# Patient Record
Sex: FEMALE | Race: WHITE | NOT HISPANIC OR LATINO | URBAN - METROPOLITAN AREA
[De-identification: names, ages, dates, MRNs, and addresses within clinical notes are randomized per-mention and may not be internally consistent; named-entity substitution may affect disease eponyms.]

---

## 2019-02-01 PROBLEM — 398050005 DIVERTICULAR DISEASE OF COLON: Status: ACTIVE | Noted: 2019-02-01

## 2019-02-06 PROBLEM — 267432004 PURE HYPERCHOLESTEROLEMIA: Status: ACTIVE | Noted: 2019-02-06

## 2019-02-06 PROBLEM — 235595009 GASTROESOPHAGEAL REFLUX DISEASE: Status: ACTIVE | Noted: 2019-02-06

## 2019-11-07 PROBLEM — 397825006 GASTRIC ULCER: Status: ACTIVE | Noted: 2019-11-07

## 2019-11-07 PROBLEM — 3723001 ARTHRITIS: Status: ACTIVE | Noted: 2019-11-07

## 2020-01-02 PROBLEM — 70153002 HEMORRHOIDS: Status: ACTIVE | Noted: 2020-01-02

## 2020-04-14 PROBLEM — 73430006 SLEEP APNEA: Status: ACTIVE | Noted: 2020-04-14

## 2020-06-09 ENCOUNTER — OFFICE VISIT (OUTPATIENT)
Dept: URBAN - METROPOLITAN AREA CLINIC 46 | Facility: CLINIC | Age: 60
End: 2020-06-09

## 2020-07-21 ENCOUNTER — OFFICE VISIT (OUTPATIENT)
Dept: URBAN - METROPOLITAN AREA CLINIC 46 | Facility: CLINIC | Age: 60
End: 2020-07-21

## 2020-10-20 ENCOUNTER — OFFICE VISIT (OUTPATIENT)
Dept: URBAN - METROPOLITAN AREA CLINIC 46 | Facility: CLINIC | Age: 60
End: 2020-10-20

## 2020-10-20 LAB
A/G RATIO: 2.1
ALBUMIN: (no result)
ALKALINE PHOSPHATASE: (no result)
ALT (SGPT): (no result)
AST (SGOT): (no result)
BASO (ABSOLUTE): (no result)
BASOS: (no result)
BILIRUBIN, TOTAL: (no result)
BUN/CREATININE RATIO: 13
BUN: (no result)
C-REACTIVE PROTEIN, QUANT: (no result)
CALCIUM: (no result)
CARBON DIOXIDE, TOTAL: (no result)
CHLORIDE: (no result)
CREATININE: (no result)
EGFR IF AFRICN AM: (no result)
EGFR IF NONAFRICN AM: (no result)
EOS (ABSOLUTE): (no result)
EOS: (no result)
GLOBULIN, TOTAL: (no result)
GLUCOSE: (no result)
HEMATOCRIT: (no result)
HEMATOLOGY COMMENTS:: (no result)
HEMOGLOBIN: (no result)
IMMATURE CELLS: (no result)
IMMATURE GRANS (ABS): (no result)
IMMATURE GRANULOCYTES: (no result)
LYMPHS (ABSOLUTE): (no result)
LYMPHS: (no result)
MCH: (no result)
MCHC: (no result)
MCV: (no result)
MONOCYTES(ABSOLUTE): (no result)
MONOCYTES: (no result)
NEUTROPHILS (ABSOLUTE): (no result)
NEUTROPHILS: (no result)
NRBC: (no result)
PLATELETS: (no result)
POTASSIUM: (no result)
PROTEIN, TOTAL: (no result)
RBC: (no result)
RDW: (no result)
SODIUM: (no result)
WBC: (no result)

## 2020-10-21 ENCOUNTER — LAB OUTSIDE AN ENCOUNTER (OUTPATIENT)
Dept: URBAN - METROPOLITAN AREA CLINIC 13 | Facility: CLINIC | Age: 60
End: 2020-10-21

## 2021-01-26 ENCOUNTER — OFFICE VISIT (OUTPATIENT)
Dept: URBAN - METROPOLITAN AREA TELEHEALTH 2 | Facility: TELEHEALTH | Age: 61
End: 2021-01-26

## 2021-02-04 ENCOUNTER — OFFICE VISIT (OUTPATIENT)
Dept: URBAN - METROPOLITAN AREA CLINIC 44 | Facility: CLINIC | Age: 61
End: 2021-02-04

## 2021-03-04 ENCOUNTER — OFFICE VISIT (OUTPATIENT)
Dept: URBAN - METROPOLITAN AREA CLINIC 44 | Facility: CLINIC | Age: 61
End: 2021-03-04

## 2021-03-10 ENCOUNTER — OFFICE VISIT (OUTPATIENT)
Dept: URBAN - METROPOLITAN AREA CLINIC 13 | Facility: CLINIC | Age: 61
End: 2021-03-10

## 2021-05-21 ENCOUNTER — OFFICE VISIT (OUTPATIENT)
Dept: URBAN - METROPOLITAN AREA CLINIC 46 | Facility: CLINIC | Age: 61
End: 2021-05-21

## 2021-06-10 ENCOUNTER — OFFICE VISIT (OUTPATIENT)
Dept: URBAN - METROPOLITAN AREA CLINIC 46 | Facility: CLINIC | Age: 61
End: 2021-06-10

## 2021-08-28 ENCOUNTER — TELEPHONE ENCOUNTER (OUTPATIENT)
Dept: URBAN - METROPOLITAN AREA CLINIC 13 | Facility: CLINIC | Age: 61
End: 2021-08-28

## 2021-08-28 RX ORDER — IBUPROFEN 200 MG/1
TABLET, COATED ORAL
OUTPATIENT
End: 2019-04-18

## 2021-08-28 RX ORDER — PREDNISONE 20 MG/1
TABLET ORAL
OUTPATIENT
Start: 2019-02-11 | End: 2019-08-15

## 2021-08-28 RX ORDER — MESALAMINE 4 G/60ML
ENEMA RECTAL
OUTPATIENT
Start: 2019-06-10 | End: 2019-08-15

## 2021-08-28 RX ORDER — MESALAMINE 4 G/60ML
ENEMA RECTAL
OUTPATIENT
Start: 2019-08-15 | End: 2019-10-03

## 2021-08-28 RX ORDER — DICYCLOMINE HYDROCHLORIDE 20 MG/1
TABLET ORAL
OUTPATIENT
Start: 2019-04-18 | End: 2021-05-21

## 2021-08-28 RX ORDER — ONDANSETRON 8 MG/1
TABLET ORAL
OUTPATIENT
Start: 2019-10-03 | End: 2020-01-02

## 2021-08-28 RX ORDER — BUDESONIDE 9 MG/1
TABLET, EXTENDED RELEASE ORAL
OUTPATIENT
Start: 2019-02-08 | End: 2019-04-18

## 2021-08-28 RX ORDER — USTEKINUMAB 130 MG/26ML
SOLUTION INTRAVENOUS
OUTPATIENT
End: 2020-10-20

## 2021-08-28 RX ORDER — PREDNISONE 10 MG/1
TABLET ORAL
OUTPATIENT
Start: 2019-09-05 | End: 2019-10-03

## 2021-08-28 RX ORDER — PREDNISONE 10 MG/1
TABLET ORAL
OUTPATIENT
Start: 2019-06-10 | End: 2019-08-15

## 2021-08-28 RX ORDER — METRONIDAZOLE 250 MG/1
TABLET ORAL
OUTPATIENT
Start: 2020-04-14 | End: 2020-06-09

## 2021-08-28 RX ORDER — OMEPRAZOLE 40 MG/1
CAPSULE, DELAYED RELEASE ORAL
OUTPATIENT
Start: 2020-06-09 | End: 2021-05-21

## 2021-08-28 RX ORDER — METRONIDAZOLE 500 MG/1
TABLET ORAL
OUTPATIENT
Start: 2019-08-15 | End: 2019-10-03

## 2021-08-28 RX ORDER — FERROUS SULFATE 324(65)MG
TABLET, DELAYED RELEASE (ENTERIC COATED) ORAL
OUTPATIENT
End: 2020-01-02

## 2021-08-29 ENCOUNTER — TELEPHONE ENCOUNTER (OUTPATIENT)
Dept: URBAN - METROPOLITAN AREA CLINIC 13 | Facility: CLINIC | Age: 61
End: 2021-08-29

## 2021-08-29 RX ORDER — ONDANSETRON HYDROCHLORIDE 4 MG/1
TABLET, FILM COATED ORAL
Status: ACTIVE | COMMUNITY
Start: 2021-05-21

## 2021-08-29 RX ORDER — OMEPRAZOLE 40 MG/1
CAPSULE, DELAYED RELEASE ORAL
Status: ACTIVE | COMMUNITY
Start: 2021-05-21

## 2021-08-29 RX ORDER — GUAIFENESIN 600 MG/1
TABLET, EXTENDED RELEASE ORAL
Status: ACTIVE | COMMUNITY

## 2021-09-13 ENCOUNTER — TELEPHONE ENCOUNTER (OUTPATIENT)
Dept: URBAN - METROPOLITAN AREA CLINIC 46 | Facility: CLINIC | Age: 61
End: 2021-09-13

## 2021-09-13 RX ORDER — MESALAMINE 375 MG/1
4 CAPSULES IN THE MORNING CAPSULE, EXTENDED RELEASE ORAL ONCE A DAY
Qty: 360 CAPSULE | Refills: 3
Start: 2019-02-08 | End: 2022-09-08

## 2021-09-16 ENCOUNTER — TELEPHONE ENCOUNTER (OUTPATIENT)
Dept: URBAN - METROPOLITAN AREA CLINIC 46 | Facility: CLINIC | Age: 61
End: 2021-09-16

## 2021-09-23 ENCOUNTER — TELEPHONE ENCOUNTER (OUTPATIENT)
Dept: URBAN - METROPOLITAN AREA CLINIC 46 | Facility: CLINIC | Age: 61
End: 2021-09-23

## 2021-11-12 ENCOUNTER — TELEPHONE ENCOUNTER (OUTPATIENT)
Dept: URBAN - METROPOLITAN AREA CLINIC 46 | Facility: CLINIC | Age: 61
End: 2021-11-12

## 2021-11-18 ENCOUNTER — OFFICE VISIT (OUTPATIENT)
Dept: URBAN - METROPOLITAN AREA CLINIC 46 | Facility: CLINIC | Age: 61
End: 2021-11-18
Payer: COMMERCIAL

## 2021-11-18 VITALS
SYSTOLIC BLOOD PRESSURE: 132 MMHG | TEMPERATURE: 98 F | HEIGHT: 67 IN | HEART RATE: 66 BPM | WEIGHT: 247.2 LBS | DIASTOLIC BLOOD PRESSURE: 65 MMHG | BODY MASS INDEX: 38.8 KG/M2

## 2021-11-18 DIAGNOSIS — K51.311 ULCERATIVE RECTOSIGMOIDITIS WITH RECTAL BLEEDING: ICD-10-CM

## 2021-11-18 PROCEDURE — 99214 OFFICE O/P EST MOD 30 MIN: CPT | Performed by: INTERNAL MEDICINE

## 2021-11-18 RX ORDER — GUAIFENESIN 600 MG/1
TABLET, EXTENDED RELEASE ORAL
Status: ACTIVE | COMMUNITY

## 2021-11-18 RX ORDER — MESALAMINE 375 MG/1
4 CAPSULES IN THE MORNING CAPSULE, EXTENDED RELEASE ORAL ONCE A DAY
Qty: 360 CAPSULE | Refills: 3 | Status: ACTIVE | COMMUNITY
Start: 2019-02-08 | End: 2022-09-08

## 2021-11-18 RX ORDER — ONDANSETRON HYDROCHLORIDE 4 MG/1
TABLET, FILM COATED ORAL
Status: DISCONTINUED | COMMUNITY
Start: 2021-05-21

## 2021-11-18 RX ORDER — ACETAMINOPHEN 500 MG/1
CAPSULE, LIQUID FILLED ORAL
Status: ACTIVE | COMMUNITY

## 2021-11-18 RX ORDER — ROSUVASTATIN CALCIUM 20 MG/1
1 TABLET TABLET, FILM COATED ORAL ONCE A DAY
Status: ACTIVE | COMMUNITY

## 2021-11-18 RX ORDER — PREDNISONE 10 MG/1
20MG ONCE A DAY FOR 3 WEEKS THEN 10MG ONCE A DAY FOR 3 WEEKS TABLET ORAL ONCE A DAY
Qty: 90 | Refills: 1 | OUTPATIENT
Start: 2021-11-18 | End: 2022-01-17

## 2021-11-18 RX ORDER — UBIDECARENONE 50 MG
AS DIRECTED CAPSULE ORAL
Status: ACTIVE | COMMUNITY

## 2021-11-18 RX ORDER — HYDROCHLOROTHIAZIDE 25 MG/1
1 TABLET IN THE MORNING TABLET ORAL ONCE A DAY
Status: ACTIVE | COMMUNITY

## 2021-11-18 RX ORDER — AMOXICILLIN 500 MG
AS DIRECTED CAPSULE ORAL
Status: ACTIVE | COMMUNITY

## 2021-11-18 RX ORDER — OMEPRAZOLE 40 MG/1
CAPSULE, DELAYED RELEASE ORAL
Status: DISCONTINUED | COMMUNITY
Start: 2021-05-21

## 2021-11-18 RX ORDER — FLUTICASONE PROPIONATE 50 UG/1
1 SPRAY IN EACH NOSTRIL SPRAY, METERED NASAL ONCE A DAY
Status: ACTIVE | COMMUNITY

## 2021-11-18 NOTE — HPI-TODAY'S VISIT:
Pt dx with left sided Ulcerative Colitis in 2/2019; still with symptoms despite oral and topical therapy. Re-staging colonsocopy 11/2019 with left sided colitis confirmed on bx and pt started on Stelara and on maintenance 90mg every 8 weeks. Despite therapy has symptoms after 6 weeks from last dose and changed dose to every 6 weeks last visit @ 5/2021. Now presents for a follow up. States had  a 2 week delay in getting dose in 8/2021  due to insurane recert and since has had more issues and having symptoms at 4 weeks  with bloody mucus in stools. Despite recent dose still having bloody mucus in stools. No fevers or chills. No nausea or emesis. No wwight loss. Last labs 7/2021 with normal CBC and CMP

## 2022-02-22 ENCOUNTER — OFFICE VISIT (OUTPATIENT)
Dept: URBAN - METROPOLITAN AREA CLINIC 46 | Facility: CLINIC | Age: 62
End: 2022-02-22
Payer: COMMERCIAL

## 2022-02-22 VITALS
HEIGHT: 67 IN | TEMPERATURE: 98.2 F | SYSTOLIC BLOOD PRESSURE: 124 MMHG | HEART RATE: 73 BPM | WEIGHT: 249.2 LBS | BODY MASS INDEX: 39.11 KG/M2 | DIASTOLIC BLOOD PRESSURE: 63 MMHG

## 2022-02-22 DIAGNOSIS — R10.13 DYSPEPSIA: ICD-10-CM

## 2022-02-22 DIAGNOSIS — K51.311 ULCERATIVE RECTOSIGMOIDITIS WITH RECTAL BLEEDING: ICD-10-CM

## 2022-02-22 PROCEDURE — 99214 OFFICE O/P EST MOD 30 MIN: CPT | Performed by: INTERNAL MEDICINE

## 2022-02-22 RX ORDER — ACETAMINOPHEN 500 MG/1
CAPSULE, LIQUID FILLED ORAL
Status: ACTIVE | COMMUNITY

## 2022-02-22 RX ORDER — UBIDECARENONE 50 MG
AS DIRECTED CAPSULE ORAL
Status: ACTIVE | COMMUNITY

## 2022-02-22 RX ORDER — PANTOPRAZOLE SODIUM 40 MG/1
1 TABLET TABLET, DELAYED RELEASE ORAL ONCE A DAY
Qty: 30 | Refills: 3 | OUTPATIENT
Start: 2022-02-22

## 2022-02-22 RX ORDER — AMOXICILLIN 500 MG
AS DIRECTED CAPSULE ORAL
Status: ACTIVE | COMMUNITY

## 2022-02-22 RX ORDER — GUAIFENESIN 600 MG/1
TABLET, EXTENDED RELEASE ORAL
Status: DISCONTINUED | COMMUNITY

## 2022-02-22 RX ORDER — PREDNISONE 10 MG/1
2 TABLET TABLET ORAL ONCE A DAY
Qty: 60 TABLET | Refills: 1 | OUTPATIENT
Start: 2022-02-22 | End: 2022-04-23

## 2022-02-22 RX ORDER — ROSUVASTATIN CALCIUM 20 MG/1
1 TABLET TABLET, FILM COATED ORAL ONCE A DAY
Status: ACTIVE | COMMUNITY

## 2022-02-22 RX ORDER — MESALAMINE 375 MG/1
4 CAPSULES IN THE MORNING CAPSULE, EXTENDED RELEASE ORAL ONCE A DAY
OUTPATIENT
Start: 2019-02-08

## 2022-02-22 RX ORDER — FLUTICASONE PROPIONATE 50 UG/1
1 SPRAY IN EACH NOSTRIL SPRAY, METERED NASAL ONCE A DAY
Status: ACTIVE | COMMUNITY

## 2022-02-22 RX ORDER — HYDROCHLOROTHIAZIDE 25 MG/1
1 TABLET IN THE MORNING TABLET ORAL ONCE A DAY
Status: ACTIVE | COMMUNITY

## 2022-02-22 RX ORDER — MESALAMINE 375 MG/1
4 CAPSULES IN THE MORNING CAPSULE, EXTENDED RELEASE ORAL ONCE A DAY
Qty: 360 CAPSULE | Refills: 3 | Status: ACTIVE | COMMUNITY
Start: 2019-02-08 | End: 2022-09-08

## 2022-02-22 NOTE — PHYSICAL EXAM GASTROINTESTINAL
Abdomen , soft, nontender, nondistended , no guarding or rigidity , no masses palpable , normal bowel sounds , Liver and Spleen , no hepatomegaly present , no hepatosplenomegaly , liver nontender , spleen not palpable Skin normal color for race, warm, dry and intact. No evidence of rash.

## 2022-02-22 NOTE — HPI-TODAY'S VISIT:
Pt dx with left sided Ulcerative Colitis in 2/2019; still with symptoms despite oral and topical therapy. Re-staging colonsocopy 11/2019 with left sided colitis confirmed on bx and pt started on Stelara and on maintenance 90mg every 8 weeks. Despite therapy symptoms after 6 weeks from last dose and changed dose to every 6 weeks  @ 5/2021. Seen for a follow up 11/2021 and had  a 2 week delay in getting dose in 8/2021  due to insurane recert and since has had more issues and having symptoms at 4 weeks  with bloody mucus in stools. Plan was to pulse with prednisone 20mg for 3 weeks then 10mg for 3 weeks. Pt now presents for a follow up. States did well on predinsone but since tapering flaring again despite compliance with Stelara 90mg every 6 weeks: tenesmus and bloody mucus stools; up to 10 a day. Also some dyspepsia and nausea. Denies weight loss or fevers; has had some diffuse joint pain.

## 2022-03-29 ENCOUNTER — TELEPHONE ENCOUNTER (OUTPATIENT)
Dept: URBAN - METROPOLITAN AREA CLINIC 46 | Facility: CLINIC | Age: 62
End: 2022-03-29

## 2022-03-29 RX ORDER — ONDANSETRON 8 MG/1
1 TABLET ON THE TONGUE AND ALLOW TO DISSOLVE TABLET, ORALLY DISINTEGRATING ORAL
Qty: 9 | Refills: 0 | OUTPATIENT
Start: 2022-04-05

## 2022-03-29 RX ORDER — PREDNISONE 10 MG/1
2 TABLET TABLET ORAL ONCE A DAY
Qty: 60 TABLET | Refills: 1
Start: 2022-02-22 | End: 2022-05-28

## 2022-04-08 ENCOUNTER — WEB ENCOUNTER (OUTPATIENT)
Dept: URBAN - METROPOLITAN AREA CLINIC 46 | Facility: CLINIC | Age: 62
End: 2022-04-08

## 2022-04-14 ENCOUNTER — OFFICE VISIT (OUTPATIENT)
Dept: URBAN - METROPOLITAN AREA CLINIC 46 | Facility: CLINIC | Age: 62
End: 2022-04-14

## 2022-04-22 PROBLEM — 64766004 ULCERATIVE COLITIS: Status: ACTIVE | Noted: 2021-09-03

## 2022-05-02 ENCOUNTER — TELEPHONE ENCOUNTER (OUTPATIENT)
Dept: URBAN - METROPOLITAN AREA CLINIC 46 | Facility: CLINIC | Age: 62
End: 2022-05-02

## 2022-05-04 ENCOUNTER — CLAIMS CREATED FROM THE CLAIM WINDOW (OUTPATIENT)
Dept: URBAN - METROPOLITAN AREA CLINIC 4 | Facility: CLINIC | Age: 62
End: 2022-05-04
Payer: COMMERCIAL

## 2022-05-04 ENCOUNTER — OFFICE VISIT (OUTPATIENT)
Dept: URBAN - METROPOLITAN AREA SURGERY CENTER 28 | Facility: SURGERY CENTER | Age: 62
End: 2022-05-04
Payer: COMMERCIAL

## 2022-05-04 DIAGNOSIS — K51.40 INFLAMMATORY POLYPS OF COLON WITHOUT COMPLICATIONS: ICD-10-CM

## 2022-05-04 DIAGNOSIS — K51.80 OTHER ULCERATIVE COLITIS WITHOUT COMPLICATIONS: ICD-10-CM

## 2022-05-04 DIAGNOSIS — K63.89 BACTERIAL OVERGROWTH SYNDROME: ICD-10-CM

## 2022-05-04 DIAGNOSIS — K51.40 INFLAMMATORY POLYP OF COLON: ICD-10-CM

## 2022-05-04 DIAGNOSIS — R13.14 CRICOPHARYNGEAL DISORDER: ICD-10-CM

## 2022-05-04 DIAGNOSIS — K63.89 CYST OF DUODENUM: ICD-10-CM

## 2022-05-04 DIAGNOSIS — K51.80 CHRONIC PANCOLONIC ULCERATIVE COLITIS: ICD-10-CM

## 2022-05-04 PROCEDURE — 45385 COLONOSCOPY W/LESION REMOVAL: CPT | Performed by: INTERNAL MEDICINE

## 2022-05-04 PROCEDURE — 43248 EGD GUIDE WIRE INSERTION: CPT | Performed by: INTERNAL MEDICINE

## 2022-05-04 PROCEDURE — G8907 PT DOC NO EVENTS ON DISCHARG: HCPCS | Performed by: INTERNAL MEDICINE

## 2022-05-04 PROCEDURE — 88305 TISSUE EXAM BY PATHOLOGIST: CPT | Performed by: PATHOLOGY

## 2022-05-04 PROCEDURE — 45380 COLONOSCOPY AND BIOPSY: CPT | Performed by: INTERNAL MEDICINE

## 2022-05-04 RX ORDER — UBIDECARENONE 50 MG
AS DIRECTED CAPSULE ORAL
Status: ACTIVE | COMMUNITY

## 2022-05-04 RX ORDER — PANTOPRAZOLE SODIUM 40 MG/1
1 TABLET TABLET, DELAYED RELEASE ORAL ONCE A DAY
Qty: 30 | Refills: 3 | Status: ACTIVE | COMMUNITY
Start: 2022-02-22

## 2022-05-04 RX ORDER — HYDROCHLOROTHIAZIDE 25 MG/1
1 TABLET IN THE MORNING TABLET ORAL ONCE A DAY
Status: ACTIVE | COMMUNITY

## 2022-05-04 RX ORDER — AMOXICILLIN 500 MG
AS DIRECTED CAPSULE ORAL
Status: ACTIVE | COMMUNITY

## 2022-05-04 RX ORDER — ONDANSETRON 8 MG/1
1 TABLET ON THE TONGUE AND ALLOW TO DISSOLVE TABLET, ORALLY DISINTEGRATING ORAL
Qty: 9 | Refills: 0 | Status: ACTIVE | COMMUNITY
Start: 2022-04-05

## 2022-05-04 RX ORDER — ACETAMINOPHEN 500 MG/1
CAPSULE, LIQUID FILLED ORAL
Status: ACTIVE | COMMUNITY

## 2022-05-04 RX ORDER — FLUTICASONE PROPIONATE 50 UG/1
1 SPRAY IN EACH NOSTRIL SPRAY, METERED NASAL ONCE A DAY
Status: ACTIVE | COMMUNITY

## 2022-05-04 RX ORDER — PREDNISONE 10 MG/1
2 TABLET TABLET ORAL ONCE A DAY
Qty: 60 TABLET | Refills: 1 | Status: ACTIVE | COMMUNITY
Start: 2022-02-22 | End: 2022-05-28

## 2022-05-04 RX ORDER — ROSUVASTATIN CALCIUM 20 MG/1
1 TABLET TABLET, FILM COATED ORAL ONCE A DAY
Status: ACTIVE | COMMUNITY

## 2022-05-04 RX ORDER — MESALAMINE 375 MG/1
4 CAPSULES IN THE MORNING CAPSULE, EXTENDED RELEASE ORAL ONCE A DAY
Status: ACTIVE | COMMUNITY
Start: 2019-02-08

## 2022-05-04 NOTE — HPI-TODAY'S VISIT:
Colonoscopy today with NO colitis seen. Symptoms likely functional. Not planning on changing stelara at this time.

## 2022-05-17 ENCOUNTER — ERX REFILL RESPONSE (OUTPATIENT)
Dept: URBAN - METROPOLITAN AREA CLINIC 44 | Facility: CLINIC | Age: 62
End: 2022-05-17

## 2022-05-17 RX ORDER — USTEKINUMAB 90 MG/ML
INJECT 1 SYRINGE UNDER THE SKIN EVERY SIX WEEKS AS DIRECTED INJECTION, SOLUTION SUBCUTANEOUS
Qty: 1 MILLILITER | Refills: 6 | OUTPATIENT

## 2022-05-24 ENCOUNTER — ERX REFILL RESPONSE (OUTPATIENT)
Dept: URBAN - METROPOLITAN AREA CLINIC 46 | Facility: CLINIC | Age: 62
End: 2022-05-24

## 2022-05-24 RX ORDER — PANTOPRAZOLE SODIUM 40 MG/1
1 TABLET TABLET, DELAYED RELEASE ORAL ONCE A DAY
Qty: 30 | Refills: 3 | OUTPATIENT

## 2022-05-24 RX ORDER — PANTOPRAZOLE SODIUM 40 MG/1
TAKE 1 TABLET BY MOUTH EVERY DAY TABLET, DELAYED RELEASE ORAL
Qty: 30 TABLET | Refills: 6 | OUTPATIENT

## 2022-06-17 ENCOUNTER — OFFICE VISIT (OUTPATIENT)
Dept: URBAN - METROPOLITAN AREA CLINIC 46 | Facility: CLINIC | Age: 62
End: 2022-06-17
Payer: COMMERCIAL

## 2022-06-17 VITALS
WEIGHT: 261.4 LBS | SYSTOLIC BLOOD PRESSURE: 123 MMHG | DIASTOLIC BLOOD PRESSURE: 73 MMHG | HEART RATE: 68 BPM | BODY MASS INDEX: 41.03 KG/M2 | TEMPERATURE: 98 F | HEIGHT: 67 IN

## 2022-06-17 DIAGNOSIS — R13.19 ESOPHAGEAL DYSPHAGIA: ICD-10-CM

## 2022-06-17 DIAGNOSIS — R10.13 DYSPEPSIA: ICD-10-CM

## 2022-06-17 DIAGNOSIS — K51.311 ULCERATIVE RECTOSIGMOIDITIS WITH RECTAL BLEEDING: ICD-10-CM

## 2022-06-17 PROBLEM — 40890009: Status: ACTIVE | Noted: 2022-06-17

## 2022-06-17 PROCEDURE — 99213 OFFICE O/P EST LOW 20 MIN: CPT | Performed by: INTERNAL MEDICINE

## 2022-06-17 RX ORDER — USTEKINUMAB 90 MG/ML
INJECT 1 SYRINGE UNDER THE SKIN EVERY SIX WEEKS AS DIRECTED INJECTION, SOLUTION SUBCUTANEOUS
Qty: 1 MILLILITER | Refills: 6 | Status: ACTIVE | COMMUNITY

## 2022-06-17 RX ORDER — OMEPRAZOLE 20 MG/1
1 TABLET 30 MINUTES BEFORE MORNING MEAL TABLET, DELAYED RELEASE ORAL ONCE A DAY
Qty: 30 | Refills: 3 | OUTPATIENT
Start: 2022-06-17

## 2022-06-17 RX ORDER — MESALAMINE 375 MG/1
4 CAPSULES IN THE MORNING CAPSULE, EXTENDED RELEASE ORAL ONCE A DAY
OUTPATIENT
Start: 2019-02-08

## 2022-06-17 RX ORDER — USTEKINUMAB 90 MG/ML
INJECT 1 SYRINGE UNDER THE SKIN EVERY SIX WEEKS AS DIRECTED INJECTION, SOLUTION SUBCUTANEOUS
OUTPATIENT

## 2022-06-17 RX ORDER — PANTOPRAZOLE SODIUM 40 MG/1
TAKE 1 TABLET BY MOUTH EVERY DAY TABLET, DELAYED RELEASE ORAL
OUTPATIENT

## 2022-06-17 RX ORDER — HYDROCHLOROTHIAZIDE 25 MG/1
1 TABLET IN THE MORNING TABLET ORAL ONCE A DAY
Status: ACTIVE | COMMUNITY

## 2022-06-17 RX ORDER — MESALAMINE 375 MG/1
4 CAPSULES IN THE MORNING CAPSULE, EXTENDED RELEASE ORAL ONCE A DAY
Status: ACTIVE | COMMUNITY
Start: 2019-02-08

## 2022-06-17 RX ORDER — ACETAMINOPHEN 500 MG/1
CAPSULE, LIQUID FILLED ORAL
Status: ACTIVE | COMMUNITY

## 2022-06-17 RX ORDER — UBIDECARENONE 50 MG
AS DIRECTED CAPSULE ORAL
Status: ACTIVE | COMMUNITY

## 2022-06-17 RX ORDER — PANTOPRAZOLE SODIUM 40 MG/1
TAKE 1 TABLET BY MOUTH EVERY DAY TABLET, DELAYED RELEASE ORAL
Qty: 30 TABLET | Refills: 6 | Status: ACTIVE | COMMUNITY

## 2022-06-17 RX ORDER — FLUTICASONE PROPIONATE 50 UG/1
1 SPRAY IN EACH NOSTRIL SPRAY, METERED NASAL ONCE A DAY
Status: ACTIVE | COMMUNITY

## 2022-06-17 RX ORDER — AMOXICILLIN 500 MG
AS DIRECTED CAPSULE ORAL
Status: ACTIVE | COMMUNITY

## 2022-06-17 RX ORDER — LOPERAMIDE HCL 2 MG/1
1 TABLET TABLET, FILM COATED ORAL
Qty: 30 | Refills: 3 | OUTPATIENT
Start: 2022-06-17 | End: 2022-10-15

## 2022-06-17 RX ORDER — ROSUVASTATIN CALCIUM 20 MG/1
1 TABLET TABLET, FILM COATED ORAL ONCE A DAY
Status: ACTIVE | COMMUNITY

## 2022-06-17 NOTE — HPI-TODAY'S VISIT:
Pt dx with left sided Ulcerative Colitis in 2/2019; still with symptoms despite oral and topical therapy. Re-staging colonsocopy 11/2019 with left sided colitis confirmed on bx and pt started on Stelara and on maintenance 90mg every 8 weeks. Despite therapy symptoms after 6 weeks from last dose and changed dose to every 6 weeks  @ 5/2021. Seen for a follow up 11/2021 and had  a 2 week delay in getting dose in 8/2021  due to insurane recert and since has had more issues and having symptoms at 4 weeks  with bloody mucus in stools. Plan was to pulse with prednisone 20mg for 3 weeks then 10mg for 3 weeks.   Seen for a follow up 2/2022 and did well on predinsone but since tapering flaring again despite compliance with Stelara 90mg every 6 weeks: tenesmus and bloody mucus stools; up to 10 a day. Also some dyspepsia and nausea. Denies weight loss or fevers; has had some diffuse joint pain. Pt put back on prednisone and Stelara levels checked and good and no Ab's. Restaging colonoscopy with active symptoms and no active disease and symptoms felt to be functional.  Random bx negative for active disease. EGD also performed for dyspepsia and dysphagia witih normal esophagus s/p empiric dilation and  antral erosions (likely from prednisone)   Now presents for a follow up. No flare of diarrhea or colitis. 5 BM per day still and soft to loose but better then earlier in year when was 11. Still on Stelara eveyr 6 weeks and daily Apriso. Taking Pantoprazole and severe GERD and dysphagia better but still with globus. No new symptoms.

## 2022-06-28 ENCOUNTER — ERX REFILL RESPONSE (OUTPATIENT)
Dept: URBAN - METROPOLITAN AREA CLINIC 46 | Facility: CLINIC | Age: 62
End: 2022-06-28

## 2022-06-28 RX ORDER — MESALAMINE 375 MG/1
4 CAPSULES IN THE MORNING CAPSULE, EXTENDED RELEASE ORAL ONCE A DAY
OUTPATIENT

## 2022-06-28 RX ORDER — MESALAMINE 375 MG/1
TAKE 4 CAPSULES BY MOUTH EVERY DAY IN THE MORNING CAPSULE, EXTENDED RELEASE ORAL
Qty: 360 CAPSULE | Refills: 3 | OUTPATIENT

## 2022-07-21 ENCOUNTER — TELEPHONE ENCOUNTER (OUTPATIENT)
Dept: URBAN - METROPOLITAN AREA CLINIC 46 | Facility: CLINIC | Age: 62
End: 2022-07-21

## 2022-08-01 ENCOUNTER — TELEPHONE ENCOUNTER (OUTPATIENT)
Dept: URBAN - METROPOLITAN AREA CLINIC 46 | Facility: CLINIC | Age: 62
End: 2022-08-01

## 2022-08-05 ENCOUNTER — TELEPHONE ENCOUNTER (OUTPATIENT)
Dept: URBAN - METROPOLITAN AREA CLINIC 46 | Facility: CLINIC | Age: 62
End: 2022-08-05

## 2022-08-05 RX ORDER — MESALAMINE 375 MG/1
TAKE 4 CAPSULES BY MOUTH EVERY DAY IN THE MORNING CAPSULE, EXTENDED RELEASE ORAL
Qty: 360 CAPSULE | Refills: 3

## 2022-12-16 ENCOUNTER — OFFICE VISIT (OUTPATIENT)
Dept: URBAN - METROPOLITAN AREA CLINIC 46 | Facility: CLINIC | Age: 62
End: 2022-12-16

## 2022-12-27 ENCOUNTER — ERX REFILL RESPONSE (OUTPATIENT)
Dept: URBAN - METROPOLITAN AREA CLINIC 46 | Facility: CLINIC | Age: 62
End: 2022-12-27

## 2022-12-27 RX ORDER — USTEKINUMAB 90 MG/ML
MAINTENANCE: INJECT 1 SYRINGE SUBCUTANEOUSLY EVERY 6 WEEKS. REFRIGERATE. DO NOT FREEZE INJECTION, SOLUTION SUBCUTANEOUS
Qty: 1 | Refills: 9 | OUTPATIENT

## 2022-12-27 RX ORDER — USTEKINUMAB 90 MG/ML
INJECT 1 SYRINGE UNDER THE SKIN EVERY SIX WEEKS AS DIRECTED INJECTION, SOLUTION SUBCUTANEOUS
OUTPATIENT

## 2023-01-06 ENCOUNTER — OFFICE VISIT (OUTPATIENT)
Dept: URBAN - METROPOLITAN AREA CLINIC 46 | Facility: CLINIC | Age: 63
End: 2023-01-06
Payer: COMMERCIAL

## 2023-01-06 ENCOUNTER — LAB OUTSIDE AN ENCOUNTER (OUTPATIENT)
Dept: URBAN - METROPOLITAN AREA CLINIC 44 | Facility: CLINIC | Age: 63
End: 2023-01-06

## 2023-01-06 VITALS
SYSTOLIC BLOOD PRESSURE: 127 MMHG | WEIGHT: 250.4 LBS | HEIGHT: 67 IN | BODY MASS INDEX: 39.3 KG/M2 | TEMPERATURE: 98 F | HEART RATE: 69 BPM | DIASTOLIC BLOOD PRESSURE: 75 MMHG

## 2023-01-06 DIAGNOSIS — R14.0 BLOATING: ICD-10-CM

## 2023-01-06 DIAGNOSIS — K51.311 ULCERATIVE RECTOSIGMOIDITIS WITH RECTAL BLEEDING: ICD-10-CM

## 2023-01-06 PROBLEM — 116289008: Status: ACTIVE | Noted: 2023-01-06

## 2023-01-06 PROBLEM — 41364008: Status: ACTIVE | Noted: 2021-11-18

## 2023-01-06 PROCEDURE — 99214 OFFICE O/P EST MOD 30 MIN: CPT | Performed by: INTERNAL MEDICINE

## 2023-01-06 RX ORDER — AMOXICILLIN 500 MG
AS DIRECTED CAPSULE ORAL
Status: ACTIVE | COMMUNITY

## 2023-01-06 RX ORDER — BIOTIN 5 MG
AS DIRECTED TABLET ORAL
Status: ACTIVE | COMMUNITY

## 2023-01-06 RX ORDER — MESALAMINE 375 MG/1
TAKE 4 CAPSULES BY MOUTH EVERY DAY IN THE MORNING CAPSULE, EXTENDED RELEASE ORAL
Qty: 360 CAPSULE | Refills: 3 | Status: ACTIVE | COMMUNITY

## 2023-01-06 RX ORDER — FLUTICASONE PROPIONATE 50 UG/1
1 SPRAY IN EACH NOSTRIL SPRAY, METERED NASAL ONCE A DAY
Status: ACTIVE | COMMUNITY

## 2023-01-06 RX ORDER — HYDROCHLOROTHIAZIDE 25 MG/1
1 TABLET IN THE MORNING TABLET ORAL ONCE A DAY
Status: ACTIVE | COMMUNITY

## 2023-01-06 RX ORDER — ACETAMINOPHEN 500 MG/1
CAPSULE, LIQUID FILLED ORAL
Status: ACTIVE | COMMUNITY

## 2023-01-06 RX ORDER — USTEKINUMAB 90 MG/ML
MAINTENANCE: INJECT 1 SYRINGE SUBCUTANEOUSLY EVERY 6 WEEKS. REFRIGERATE. DO NOT FREEZE INJECTION, SOLUTION SUBCUTANEOUS
Qty: 1 | Refills: 9 | Status: ACTIVE | COMMUNITY

## 2023-01-06 RX ORDER — UBIDECARENONE 50 MG
AS DIRECTED CAPSULE ORAL
Status: ACTIVE | COMMUNITY

## 2023-01-06 RX ORDER — USTEKINUMAB 90 MG/ML
INJECT 1 SYRINGE UNDER THE SKIN EVERY SIX WEEKS AS DIRECTED INJECTION, SOLUTION SUBCUTANEOUS
OUTPATIENT

## 2023-01-06 RX ORDER — ROSUVASTATIN CALCIUM 20 MG/1
1 TABLET TABLET, FILM COATED ORAL ONCE A DAY
Status: ACTIVE | COMMUNITY

## 2023-01-06 NOTE — HPI-TODAY'S VISIT:
Pt dx with left sided Ulcerative Colitis in 2/2019; still with symptoms despite oral and topical therapy. Re-staging colonsocopy 11/2019 with left sided colitis confirmed on bx and pt started on Stelara and on maintenance 90mg every 8 weeks. Despite therapy symptoms after 6 weeks from last dose and changed dose to every 6 weeks  @ 5/2021. Seen for a follow up 11/2021 and had  a 2 week delay in getting dose in 8/2021  due to insurane recert and since has had more issues and having symptoms at 4 weeks  with bloody mucus in stools. Plan was to pulse with prednisone 20mg for 3 weeks then 10mg for 3 weeks.   Seen for a follow up 2/2022 and did well on predinsone but since tapering flaring again despite compliance with Stelara 90mg every 6 weeks: tenesmus and bloody mucus stools; up to 10 a day. Also some dyspepsia and nausea. Denies weight loss or fevers; has had some diffuse joint pain. Pt put back on prednisone and Stelara levels checked and good and no Ab's. Restaging colonoscopy with active symptoms and no active disease and symptoms felt to be functional.  Random bx negative for active disease. EGD also performed for dyspepsia and dysphagia witih normal esophagus s/p empiric dilation and  antral erosions (likely from prednisone)   6/2022: Now presents for a follow up. No flare of diarrhea or colitis. 5 BM per day still and soft to loose but better then earlier in year when was 11. Still on Stelara eveyr 6 weeks and daily Apriso. Taking Pantoprazole and severe GERD and dysphagia better but still with globus. No new symptoms.  1/6/2023: Pt for a follow up. Diarrhea has been better on it's own. Stools more formed. 2-3 BM per day. No bleeding. Peripheral Joint symptoms stable and no central joint pain. No fatigue. No dyspagia or GERD. Labs 9/2022 at PCP reviewed and normal CBC and CMP. On Stelara 90mg eveyr 6 weeks and Apriso 1.5gm per day. Still at times LLQ cramping. pt asking about coming off biologics over time.

## 2023-01-09 LAB — HEPATITIS B SURFACE ANTIGEN: (no result)

## 2023-01-11 LAB
HEPATITIS B SURFACE ANTIGEN: (no result)
MITOGEN-NIL: >10
QUANTIFERON NIL VALUE: 0.08
QUANTIFERON TB1 AG VALUE: <0
QUANTIFERON TB2 AG VALUE: <0
QUANTIFERON-TB GOLD PLUS: NEGATIVE

## 2023-03-08 ENCOUNTER — TELEPHONE ENCOUNTER (OUTPATIENT)
Dept: URBAN - METROPOLITAN AREA CLINIC 44 | Facility: CLINIC | Age: 63
End: 2023-03-08

## 2023-03-08 PROBLEM — 444548001: Status: ACTIVE | Noted: 2021-09-23

## 2023-03-08 RX ORDER — USTEKINUMAB 90 MG/ML
INJECT 1 SYRINGE UNDER THE SKIN EVERY SIX WEEKS AS DIRECTED INJECTION, SOLUTION SUBCUTANEOUS
Qty: 42 | Refills: 6

## 2023-04-04 ENCOUNTER — TELEPHONE ENCOUNTER (OUTPATIENT)
Dept: URBAN - METROPOLITAN AREA CLINIC 44 | Facility: CLINIC | Age: 63
End: 2023-04-04

## 2023-04-26 ENCOUNTER — TELEPHONE ENCOUNTER (OUTPATIENT)
Dept: URBAN - METROPOLITAN AREA CLINIC 46 | Facility: CLINIC | Age: 63
End: 2023-04-26

## 2023-06-28 ENCOUNTER — ERX REFILL RESPONSE (OUTPATIENT)
Dept: URBAN - METROPOLITAN AREA CLINIC 44 | Facility: CLINIC | Age: 63
End: 2023-06-28

## 2023-06-28 RX ORDER — OMEPRAZOLE 20 MG/1
TAKE 1 TABLET BY MOUTH EVERY DAY 30 MINS BEFORE BREAKFAST TABLET, DELAYED RELEASE ORAL
Qty: 30 TABLET | Refills: 4 | OUTPATIENT

## 2023-06-28 RX ORDER — OMEPRAZOLE 20 MG/1
TAKE 1 TABLET BY MOUTH EVERY DAY 30 MINS BEFORE BREAKFAST TABLET, DELAYED RELEASE ORAL
Qty: 30 TABLET | Refills: 11 | OUTPATIENT

## 2023-06-30 ENCOUNTER — ERX REFILL RESPONSE (OUTPATIENT)
Dept: URBAN - METROPOLITAN AREA CLINIC 44 | Facility: CLINIC | Age: 63
End: 2023-06-30

## 2023-06-30 RX ORDER — OMEPRAZOLE 20 MG/1
TAKE 1 TABLET BY MOUTH EVERY DAY 30 MINS BEFORE BREAKFAST TABLET, DELAYED RELEASE ORAL
Qty: 30 TABLET | Refills: 3 | OUTPATIENT

## 2023-06-30 RX ORDER — OMEPRAZOLE 20 MG/1
TAKE 1 TABLET BY MOUTH EVERY DAY 30 MINS BEFORE BREAKFAST TABLET, DELAYED RELEASE ORAL
Qty: 30 TABLET | Refills: 4 | OUTPATIENT

## 2023-08-16 ENCOUNTER — DASHBOARD ENCOUNTERS (OUTPATIENT)
Age: 63
End: 2023-08-16

## 2023-08-17 ENCOUNTER — WEB ENCOUNTER (OUTPATIENT)
Dept: URBAN - METROPOLITAN AREA CLINIC 46 | Facility: CLINIC | Age: 63
End: 2023-08-17

## 2023-08-17 RX ORDER — OMEPRAZOLE 20 MG/1
TAKE 1 TABLET BY MOUTH EVERY DAY 30 MINS BEFORE BREAKFAST TABLET, DELAYED RELEASE ORAL ONCE A DAY
Qty: 90 | Refills: 3

## 2023-08-18 ENCOUNTER — OFFICE VISIT (OUTPATIENT)
Dept: URBAN - METROPOLITAN AREA CLINIC 46 | Facility: CLINIC | Age: 63
End: 2023-08-18
Payer: COMMERCIAL

## 2023-08-18 VITALS
HEART RATE: 84 BPM | TEMPERATURE: 97.7 F | SYSTOLIC BLOOD PRESSURE: 124 MMHG | WEIGHT: 216.6 LBS | DIASTOLIC BLOOD PRESSURE: 61 MMHG | BODY MASS INDEX: 34 KG/M2 | HEIGHT: 67 IN

## 2023-08-18 DIAGNOSIS — R10.13 DYSPEPSIA: ICD-10-CM

## 2023-08-18 DIAGNOSIS — K51.311 ULCERATIVE RECTOSIGMOIDITIS WITH RECTAL BLEEDING: ICD-10-CM

## 2023-08-18 PROBLEM — 162031009: Status: ACTIVE | Noted: 2022-02-22

## 2023-08-18 PROCEDURE — 99214 OFFICE O/P EST MOD 30 MIN: CPT | Performed by: INTERNAL MEDICINE

## 2023-08-18 RX ORDER — LANSOPRAZOLE 30 MG/1
1 CAPSULE BEFORE A MEAL CAPSULE, DELAYED RELEASE ORAL ONCE A DAY
Status: ACTIVE | COMMUNITY

## 2023-08-18 RX ORDER — USTEKINUMAB 90 MG/ML
MAINTENANCE: INJECT 1 SYRINGE SUBCUTANEOUSLY EVERY 6 WEEKS. REFRIGERATE. DO NOT FREEZE INJECTION, SOLUTION SUBCUTANEOUS
Qty: 1 | Refills: 9 | Status: ACTIVE | COMMUNITY

## 2023-08-18 RX ORDER — APIXABAN 5 MG/1
1 TABLET TABLET, FILM COATED ORAL TWICE A DAY
Status: ACTIVE | COMMUNITY

## 2023-08-18 RX ORDER — USTEKINUMAB 90 MG/ML
INJECT 1 SYRINGE UNDER THE SKIN EVERY SIX WEEKS AS DIRECTED INJECTION, SOLUTION SUBCUTANEOUS
OUTPATIENT

## 2023-08-18 RX ORDER — ACETAMINOPHEN 500 MG/1
CAPSULE, LIQUID FILLED ORAL
Status: ACTIVE | COMMUNITY

## 2023-08-18 RX ORDER — FEXOFENADINE HYDROCHLORIDE 180 MG/1
1 TABLET SWALLOW WHOLE WITH WATER; DO NOT TAKE WITH FRUIT JUICES TABLET ORAL ONCE A DAY
Status: ACTIVE | COMMUNITY

## 2023-08-18 RX ORDER — AMOXICILLIN 500 MG
AS DIRECTED CAPSULE ORAL
Status: ACTIVE | COMMUNITY

## 2023-08-18 RX ORDER — ROSUVASTATIN CALCIUM 20 MG/1
1 TABLET TABLET, FILM COATED ORAL ONCE A DAY
Status: ACTIVE | COMMUNITY

## 2023-08-18 RX ORDER — HYDROCHLOROTHIAZIDE 25 MG/1
1 TABLET IN THE MORNING TABLET ORAL ONCE A DAY
Status: ACTIVE | COMMUNITY

## 2023-08-18 RX ORDER — FLECAINIDE ACETATE 50 MG/1
2 TABLETS TABLET ORAL
Status: ACTIVE | COMMUNITY

## 2023-08-18 RX ORDER — LANSOPRAZOLE 30 MG/1
1 CAPSULE BEFORE A MEAL CAPSULE, DELAYED RELEASE ORAL ONCE A DAY
Qty: 90 | Refills: 0

## 2023-08-18 RX ORDER — UBIDECARENONE 50 MG
AS DIRECTED CAPSULE ORAL
Status: ACTIVE | COMMUNITY

## 2023-08-18 RX ORDER — ONDANSETRON 8 MG/1
1 TABLET TABLET, ORALLY DISINTEGRATING ORAL
Qty: 30 | Refills: 3 | OUTPATIENT
Start: 2023-08-18

## 2023-08-18 RX ORDER — FLUTICASONE PROPIONATE 50 UG/1
1 SPRAY IN EACH NOSTRIL SPRAY, METERED NASAL ONCE A DAY
Status: ACTIVE | COMMUNITY

## 2023-08-18 RX ORDER — MESALAMINE 375 MG/1
TAKE 4 CAPSULES BY MOUTH EVERY DAY IN THE MORNING CAPSULE, EXTENDED RELEASE ORAL
OUTPATIENT

## 2023-08-18 RX ORDER — MESALAMINE 375 MG/1
TAKE 4 CAPSULES BY MOUTH EVERY DAY IN THE MORNING CAPSULE, EXTENDED RELEASE ORAL
Qty: 360 CAPSULE | Refills: 3 | Status: ACTIVE | COMMUNITY

## 2023-08-18 RX ORDER — BIOTIN 5 MG
AS DIRECTED TABLET ORAL
Status: ACTIVE | COMMUNITY

## 2023-08-18 NOTE — HPI-TODAY'S VISIT:
Pt dx with left sided Ulcerative Colitis in 2/2019; still with symptoms despite oral and topical therapy. Re-staging colonsocopy 11/2019 with left sided colitis confirmed on bx and pt started on Stelara and on maintenance 90mg every 8 weeks. Despite therapy symptoms after 6 weeks from last dose and changed dose to every 6 weeks  @ 5/2021. Seen for a follow up 11/2021 and had  a 2 week delay in getting dose in 8/2021  due to insurane recert and since has had more issues and having symptoms at 4 weeks  with bloody mucus in stools. Plan was to pulse with prednisone 20mg for 3 weeks then 10mg for 3 weeks.   Seen for a follow up 2/2022 and did well on predinsone but since tapering flaring again despite compliance with Stelara 90mg every 6 weeks: tenesmus and bloody mucus stools; up to 10 a day. Also some dyspepsia and nausea. Denies weight loss or fevers; has had some diffuse joint pain. Pt put back on prednisone and Stelara levels checked and good and no Ab's. Restaging colonoscopy with active symptoms and no active disease and symptoms felt to be functional.  Random bx negative for active disease. EGD also performed for dyspepsia and dysphagia witih normal esophagus s/p empiric dilation and  antral erosions (likely from prednisone)   6/2022: Now presents for a follow up. No flare of diarrhea or colitis. 5 BM per day still and soft to loose but better then earlier in year when was 11. Still on Stelara eveyr 6 weeks and daily Apriso. Taking Pantoprazole and severe GERD and dysphagia better but still with globus. No new symptoms.  1/6/2023: Pt for a follow up. Diarrhea has been better on it's own. Stools more formed. 2-3 BM per day. No bleeding. Peripheral Joint symptoms stable and no central joint pain. No fatigue. No dyspagia or GERD. Labs 9/2022 at PCP reviewed and normal CBC and CMP. On Stelara 90mg eveyr 6 weeks and Apriso 1.5gm per day. Still at times LLQ cramping. pt asking about coming off biologics over time.....QTG rechecked and neg; Stelara 90mg every 6 weeks continued; advised ok to wean off oral mesalamime  8/18/2023: For a follow up. No colitis symptoms but still with alternating BM. On Stelara 90mg every 6 weeks; did not stop oral mesalamine. recent dx of PAfibn and needig high dose Flecanaide; and now on ELiquis (4/2023). Since starting having GERD and dyspepsia but also ran out of PPI. No melena. No joint pain. Has some brain fog and fatigue. No labs since staring Eliquis

## 2023-08-23 LAB
A/G RATIO: 1.7
ABSOLUTE BASOPHILS: 80
ABSOLUTE EOSINOPHILS: 285
ABSOLUTE LYMPHOCYTES: 3865
ABSOLUTE MONOCYTES: 798
ABSOLUTE NEUTROPHILS: 6373
ALBUMIN: 4.3
ALKALINE PHOSPHATASE: 92
ALT (SGPT): 15
AST (SGOT): 17
BASOPHILS: 0.7
BILIRUBIN, TOTAL: 0.3
BUN/CREATININE RATIO: (no result)
BUN: 24
C-REACTIVE PROTEIN, QUANT: 4
CALCIUM: 9.9
CARBON DIOXIDE, TOTAL: 27
CHLORIDE: 99
CREATININE: 1.03
EGFR: 61
EOSINOPHILS: 2.5
GLOBULIN, TOTAL: 2.5
GLUCOSE: 93
HEMATOCRIT: 38.8
HEMOGLOBIN: 13
LYMPHOCYTES: 33.9
MCH: 28.9
MCHC: 33.5
MCV: 86.2
MITOGEN-NIL: >10
MONOCYTES: 7
MPV: 10.5
NEUTROPHILS: 55.9
PLATELET COUNT: 310
POTASSIUM: 3.9
PROTEIN, TOTAL: 6.8
QUANTIFERON NIL VALUE: 0.03
QUANTIFERON TB1 AG VALUE: 0
QUANTIFERON TB2 AG VALUE: 0
QUANTIFERON-TB GOLD PLUS: NEGATIVE
RDW: 13.5
RED BLOOD CELL COUNT: 4.5
SODIUM: 139
WHITE BLOOD CELL COUNT: 11.4

## 2023-08-24 ENCOUNTER — TELEPHONE ENCOUNTER (OUTPATIENT)
Dept: URBAN - METROPOLITAN AREA CLINIC 44 | Facility: CLINIC | Age: 63
End: 2023-08-24

## 2023-08-29 ENCOUNTER — TELEPHONE ENCOUNTER (OUTPATIENT)
Dept: URBAN - METROPOLITAN AREA CLINIC 46 | Facility: CLINIC | Age: 63
End: 2023-08-29

## 2023-11-16 ENCOUNTER — ERX REFILL RESPONSE (OUTPATIENT)
Dept: URBAN - METROPOLITAN AREA CLINIC 46 | Facility: CLINIC | Age: 63
End: 2023-11-16

## 2023-11-16 RX ORDER — LANSOPRAZOLE 30 MG/1
1 CAPSULE BEFORE A MEAL CAPSULE, DELAYED RELEASE ORAL ONCE A DAY
Qty: 90 | Refills: 0 | OUTPATIENT

## 2023-11-16 RX ORDER — LANSOPRAZOLE 30 MG/1
TAKE 1 CAPSULE BY MOUTH EVERY DAY BEFORE A MEAL CAPSULE, DELAYED RELEASE ORAL
Qty: 90 CAPSULE | Refills: 3 | OUTPATIENT

## 2024-02-22 ENCOUNTER — OV EP (OUTPATIENT)
Dept: URBAN - METROPOLITAN AREA CLINIC 46 | Facility: CLINIC | Age: 64
End: 2024-02-22

## 2024-05-03 ENCOUNTER — TELEPHONE ENCOUNTER (OUTPATIENT)
Dept: URBAN - METROPOLITAN AREA CLINIC 46 | Facility: CLINIC | Age: 64
End: 2024-05-03

## 2024-05-06 ENCOUNTER — TELEPHONE ENCOUNTER (OUTPATIENT)
Dept: URBAN - METROPOLITAN AREA CLINIC 44 | Facility: CLINIC | Age: 64
End: 2024-05-06

## 2024-06-10 ENCOUNTER — TELEPHONE ENCOUNTER (OUTPATIENT)
Dept: URBAN - METROPOLITAN AREA CLINIC 44 | Facility: CLINIC | Age: 64
End: 2024-06-10

## 2024-06-12 ENCOUNTER — TELEPHONE ENCOUNTER (OUTPATIENT)
Dept: URBAN - METROPOLITAN AREA CLINIC 46 | Facility: CLINIC | Age: 64
End: 2024-06-12

## 2024-06-12 RX ORDER — USTEKINUMAB 90 MG/ML
INJECT 90 MG INJECTION, SOLUTION SUBCUTANEOUS
Qty: 2 PEN NEEDLE | Refills: 5

## 2024-06-12 RX ORDER — USTEKINUMAB 130 MG/26ML
AS DIRECTED SOLUTION INTRAVENOUS ONCE
Qty: 1 | Refills: 0 | OUTPATIENT
Start: 2024-06-26 | End: 2024-06-27

## 2024-06-20 ENCOUNTER — TELEPHONE ENCOUNTER (OUTPATIENT)
Dept: URBAN - METROPOLITAN AREA CLINIC 46 | Facility: CLINIC | Age: 64
End: 2024-06-20

## 2024-06-20 ENCOUNTER — OFFICE VISIT (OUTPATIENT)
Dept: URBAN - METROPOLITAN AREA CLINIC 46 | Facility: CLINIC | Age: 64
End: 2024-06-20
Payer: COMMERCIAL

## 2024-06-20 VITALS
DIASTOLIC BLOOD PRESSURE: 67 MMHG | HEART RATE: 81 BPM | TEMPERATURE: 97.7 F | SYSTOLIC BLOOD PRESSURE: 108 MMHG | BODY MASS INDEX: 36.1 KG/M2 | WEIGHT: 230 LBS | HEIGHT: 67 IN

## 2024-06-20 DIAGNOSIS — K30 DYSPEPSIA: ICD-10-CM

## 2024-06-20 DIAGNOSIS — K51.30 ULCERATIVE (CHRONIC) PROCTOSIGMOIDITIS, WITHOUT COMPLICATIONS: ICD-10-CM

## 2024-06-20 PROBLEM — 41364008: Status: ACTIVE | Noted: 2024-06-20

## 2024-06-20 PROCEDURE — 99214 OFFICE O/P EST MOD 30 MIN: CPT | Performed by: INTERNAL MEDICINE

## 2024-06-20 RX ORDER — AMOXICILLIN 500 MG
AS DIRECTED CAPSULE ORAL
Status: ACTIVE | COMMUNITY

## 2024-06-20 RX ORDER — LANSOPRAZOLE 30 MG/1
TAKE 1 CAPSULE BY MOUTH EVERY DAY BEFORE A MEAL CAPSULE, DELAYED RELEASE ORAL
Qty: 90 CAPSULE | Refills: 3 | Status: ACTIVE | COMMUNITY

## 2024-06-20 RX ORDER — ACETAMINOPHEN 500 MG/1
2 TABLETS TABLET ORAL TWICE A DAY
Status: ACTIVE | COMMUNITY

## 2024-06-20 RX ORDER — USTEKINUMAB 90 MG/ML
PRE-FILLED SYRINGE INJECTION, SOLUTION SUBCUTANEOUS
Qty: 2 | Refills: 5 | OUTPATIENT
Start: 2024-06-20 | End: 2025-02-26

## 2024-06-20 RX ORDER — HYDROCHLOROTHIAZIDE 25 MG/1
1 TABLET IN THE MORNING TABLET ORAL ONCE A DAY
Status: ACTIVE | COMMUNITY

## 2024-06-20 RX ORDER — USTEKINUMAB 130 MG/26ML
AS DIRECTED SOLUTION INTRAVENOUS ONCE
Qty: 1 | Refills: 0 | OUTPATIENT
Start: 2024-06-20 | End: 2024-06-21

## 2024-06-20 RX ORDER — FEXOFENADINE HYDROCHLORIDE 180 MG/1
1 TABLET SWALLOW WHOLE WITH WATER; DO NOT TAKE WITH FRUIT JUICES TABLET ORAL ONCE A DAY
Status: ON HOLD | COMMUNITY

## 2024-06-20 RX ORDER — FLUTICASONE PROPIONATE 50 UG/1
1 SPRAY IN EACH NOSTRIL SPRAY, METERED NASAL ONCE A DAY
Status: ACTIVE | COMMUNITY

## 2024-06-20 RX ORDER — UBIDECARENONE 50 MG
AS DIRECTED CAPSULE ORAL
Status: ACTIVE | COMMUNITY

## 2024-06-20 RX ORDER — USTEKINUMAB 90 MG/ML
MAINTENANCE: INJECT 1 SYRINGE SUBCUTANEOUSLY EVERY 6 WEEKS. REFRIGERATE. DO NOT FREEZE INJECTION, SOLUTION SUBCUTANEOUS
Qty: 1 | Refills: 9 | Status: ACTIVE | COMMUNITY

## 2024-06-20 RX ORDER — ONDANSETRON 8 MG/1
1 TABLET TABLET, ORALLY DISINTEGRATING ORAL
Qty: 30 | Refills: 3 | Status: ACTIVE | COMMUNITY
Start: 2023-08-18

## 2024-06-20 RX ORDER — APIXABAN 5 MG/1
1 TABLET TABLET, FILM COATED ORAL TWICE A DAY
Status: ON HOLD | COMMUNITY

## 2024-06-20 RX ORDER — ROSUVASTATIN 20 MG/1
1 TABLET TABLET, FILM COATED ORAL ONCE A DAY
Status: ACTIVE | COMMUNITY

## 2024-06-20 RX ORDER — USTEKINUMAB 90 MG/ML
INJECT 90 MG INJECTION, SOLUTION SUBCUTANEOUS
Qty: 2 PEN NEEDLE | Refills: 5 | Status: ACTIVE | COMMUNITY
End: 2025-02-19

## 2024-06-20 NOTE — HPI-TODAY'S VISIT:
64-year-old female presents for follow-up. She was dx'd with left-sided Ulcerative Colitis in 2019; still with symptoms despite oral and topical therapy. Re-staging colonsocopy 11/2019 with left-sided colitis confirmed on bx and pt started on Stelara w/ maintenence 90mg every 8 weeks. Despite therapy, she had symptoms after 6 weeks from last dose and we changed dose to every 6 weeks starting 5/2021. Seen for a follow up 11/2021 and had  a 2 week delay in getting dose in 8/2021  due to insurance recert and since then had more issues w/ symptoms at 4 weeks, bloody mucus in stools. Plan was prenisone taper.  Seen for a follow up 2/2022 and did well on predinsone but since tapering she was flaring again despite compliance with Stelara 90mg every 6 weeks: tenesmus and up to 10 bloody stools daily with mucus. Also some dyspepsia and nausea. Pt put back on prednisone and Stelara levels checked. Levels were adequate w/ no Abs. Restaging colonoscopy and random bx with no active disease-- symptoms felt to be functional. EGD also performed for dyspepsia and dysphagia witih normal esophagus s/p empiric dilation and  antral erosions (likely from prednisone).  6/2022: No flare of diarrhea or colitis. 5 BMs per day, and soft/loose but improved from earlier in year. Still on Stelara every 6 weeks and daily Apriso. Taking Pantoprazole with improvement GERD/dysphagia, but still with globus.  1/6/2023: Stools more formed. 2-3 BMs per day w/ no bleeding. Peripheral joint symptoms stable and no central joint pain. No dysphagia or GERD. Labs 9/2022 at PCP reviewed and normal CBC and CMP. On Stelara 90mg every 6 weeks and Apriso 1.5 g per day. Still at times endorsing LLQ cramping. QTG rechecked and neg; Stelara 90mg every 6 weeks continued; advised ok to wean off oral mesalamime.  8/18/2023: For a follow up. No colitis symptoms but still with alternating BMs. On Stelara 90mg every 6 weeks; did not stop oral mesalamine. recent dx of PAfib and needing high dose Flecanaide; and now on Eliquis (4/2023). Since starting, she has had GERD and dyspepsia but also ran out of PPI. Has some brain fog and fatigue; no labs since staring Eliquis  6/20/2024: Seen today for a follow-up. She was taking Stelara 90mg [id number], but had an issue with approval and did not take it for approx 12 weeks. She was then given a sample last week. She weaned off mesalamine 1 year ago. She has been experiencing loose stools approx 3-4x per day with lower abdominal cramping and endorses mucus in stool as well as tenesmus. She reports nausea, bloating/gas, and constant "gnawing, hunger-like sensation". Denies rectal bleeding, weight loss, fever. She is taking lansoprazole 30 mg daily, and zofran as needed for her nausea.  Colonoscopy 5/4/2022 showed hypertrophied anal papillae, inflammatory pseudopolyp.  Rectal biopsy consistent w/ quiescent ulcerative colitis.  Recommended repeat colon in ?5 years. EGD performed at the same time showed erosive gastropathy with stigmata of recent bleeding, otherwise normal and no specimens were collected.

## 2024-06-21 ENCOUNTER — TELEPHONE ENCOUNTER (OUTPATIENT)
Dept: URBAN - METROPOLITAN AREA CLINIC 46 | Facility: CLINIC | Age: 64
End: 2024-06-21

## 2024-06-21 LAB
A/G RATIO: 1.8
ABSOLUTE BASOPHILS: 53
ABSOLUTE EOSINOPHILS: 150
ABSOLUTE LYMPHOCYTES: 3133
ABSOLUTE MONOCYTES: 554
ABSOLUTE NEUTROPHILS: 4910
ALBUMIN: 4.4
ALKALINE PHOSPHATASE: 73
ALT (SGPT): 15
AST (SGOT): 16
BASOPHILS: 0.6
BILIRUBIN, TOTAL: 0.3
BUN/CREATININE RATIO: 47
BUN: 34
C-REACTIVE PROTEIN, QUANT: <3
CALCIUM: 9.6
CARBON DIOXIDE, TOTAL: 27
CHLORIDE: 103
CREATININE: 0.72
EGFR: 94
EOSINOPHILS: 1.7
GLOBULIN, TOTAL: 2.5
GLUCOSE: 96
HEMATOCRIT: 37
HEMOGLOBIN: 12
LYMPHOCYTES: 35.6
MCH: 28.4
MCHC: 32.4
MCV: 87.5
MONOCYTES: 6.3
MPV: 10.4
NEUTROPHILS: 55.8
PLATELET COUNT: 251
POTASSIUM: 4.2
PROTEIN, TOTAL: 6.9
RDW: 13.3
RED BLOOD CELL COUNT: 4.23
SODIUM: 140
WHITE BLOOD CELL COUNT: 8.8

## 2024-06-21 RX ORDER — USTEKINUMAB 90 MG/ML
PRE-FILLED SYRINGE INJECTION, SOLUTION SUBCUTANEOUS
Qty: 2 | Refills: 5
Start: 2024-06-20 | End: 2025-03-03

## 2024-07-11 ENCOUNTER — TELEPHONE ENCOUNTER (OUTPATIENT)
Dept: URBAN - METROPOLITAN AREA CLINIC 44 | Facility: CLINIC | Age: 64
End: 2024-07-11

## 2024-07-12 ENCOUNTER — OFFICE VISIT (OUTPATIENT)
Dept: URBAN - METROPOLITAN AREA CLINIC 46 | Facility: CLINIC | Age: 64
End: 2024-07-12

## 2024-07-30 ENCOUNTER — OFFICE VISIT (OUTPATIENT)
Dept: URBAN - METROPOLITAN AREA CLINIC 43 | Facility: CLINIC | Age: 64
End: 2024-07-30
Payer: COMMERCIAL

## 2024-07-30 VITALS
DIASTOLIC BLOOD PRESSURE: 81 MMHG | TEMPERATURE: 97.5 F | HEIGHT: 67 IN | WEIGHT: 235.6 LBS | RESPIRATION RATE: 17 BRPM | SYSTOLIC BLOOD PRESSURE: 140 MMHG | BODY MASS INDEX: 36.98 KG/M2 | HEART RATE: 85 BPM

## 2024-07-30 DIAGNOSIS — K51.30 ULCERATIVE RECTOSIGMOIDITIS: ICD-10-CM

## 2024-07-30 PROCEDURE — 96413 CHEMO IV INFUSION 1 HR: CPT | Performed by: INTERNAL MEDICINE

## 2024-07-30 RX ORDER — UBIDECARENONE 50 MG
AS DIRECTED CAPSULE ORAL
Status: ACTIVE | COMMUNITY

## 2024-07-30 RX ORDER — USTEKINUMAB 90 MG/ML
MAINTENANCE: INJECT 1 SYRINGE SUBCUTANEOUSLY EVERY 6 WEEKS. REFRIGERATE. DO NOT FREEZE INJECTION, SOLUTION SUBCUTANEOUS
Qty: 1 | Refills: 9 | Status: ACTIVE | COMMUNITY

## 2024-07-30 RX ORDER — FEXOFENADINE HYDROCHLORIDE 180 MG/1
1 TABLET SWALLOW WHOLE WITH WATER; DO NOT TAKE WITH FRUIT JUICES TABLET ORAL ONCE A DAY
Status: ON HOLD | COMMUNITY

## 2024-07-30 RX ORDER — USTEKINUMAB 90 MG/ML
INJECT 90 MG INJECTION, SOLUTION SUBCUTANEOUS
Qty: 2 PEN NEEDLE | Refills: 5 | Status: ACTIVE | COMMUNITY
End: 2025-02-19

## 2024-07-30 RX ORDER — FLUTICASONE PROPIONATE 50 UG/1
1 SPRAY IN EACH NOSTRIL SPRAY, METERED NASAL ONCE A DAY
Status: ACTIVE | COMMUNITY

## 2024-07-30 RX ORDER — ROSUVASTATIN 20 MG/1
1 TABLET TABLET, FILM COATED ORAL ONCE A DAY
Status: ACTIVE | COMMUNITY

## 2024-07-30 RX ORDER — ACETAMINOPHEN 500 MG/1
2 TABLETS TABLET ORAL TWICE A DAY
Status: ACTIVE | COMMUNITY

## 2024-07-30 RX ORDER — ONDANSETRON 8 MG/1
1 TABLET TABLET, ORALLY DISINTEGRATING ORAL
Qty: 30 | Refills: 3 | Status: ACTIVE | COMMUNITY
Start: 2023-08-18

## 2024-07-30 RX ORDER — APIXABAN 5 MG/1
1 TABLET TABLET, FILM COATED ORAL TWICE A DAY
Status: ON HOLD | COMMUNITY

## 2024-07-30 RX ORDER — HYDROCHLOROTHIAZIDE 25 MG/1
1 TABLET IN THE MORNING TABLET ORAL ONCE A DAY
Status: ACTIVE | COMMUNITY

## 2024-07-30 RX ORDER — LANSOPRAZOLE 30 MG/1
TAKE 1 CAPSULE BY MOUTH EVERY DAY BEFORE A MEAL CAPSULE, DELAYED RELEASE ORAL
Qty: 90 CAPSULE | Refills: 3 | Status: ACTIVE | COMMUNITY

## 2024-07-30 RX ORDER — AMOXICILLIN 500 MG
AS DIRECTED CAPSULE ORAL
Status: ACTIVE | COMMUNITY

## 2024-07-30 RX ORDER — USTEKINUMAB 90 MG/ML
PRE-FILLED SYRINGE INJECTION, SOLUTION SUBCUTANEOUS
Qty: 2 | Refills: 5 | Status: ACTIVE | COMMUNITY
Start: 2024-06-20 | End: 2025-03-03

## 2024-08-02 ENCOUNTER — TELEPHONE ENCOUNTER (OUTPATIENT)
Dept: URBAN - METROPOLITAN AREA CLINIC 46 | Facility: CLINIC | Age: 64
End: 2024-08-02

## 2024-08-16 ENCOUNTER — TELEPHONE ENCOUNTER (OUTPATIENT)
Dept: URBAN - METROPOLITAN AREA CLINIC 46 | Facility: CLINIC | Age: 64
End: 2024-08-16

## 2024-08-16 RX ORDER — LANSOPRAZOLE 30 MG/1
TAKE 1 CAPSULE BY MOUTH EVERY DAY BEFORE A MEAL CAPSULE, DELAYED RELEASE ORAL ONCE A DAY
Qty: 90 CAPSULE | Refills: 3

## 2024-08-19 ENCOUNTER — TELEPHONE ENCOUNTER (OUTPATIENT)
Dept: URBAN - METROPOLITAN AREA CLINIC 46 | Facility: CLINIC | Age: 64
End: 2024-08-19

## 2024-08-19 RX ORDER — LANSOPRAZOLE 30 MG/1
1 CAPSULE BEFORE A MEAL CAPSULE, DELAYED RELEASE ORAL ONCE A DAY
Qty: 90 CAPSULE | Refills: 3

## 2024-09-20 ENCOUNTER — TELEPHONE ENCOUNTER (OUTPATIENT)
Dept: URBAN - METROPOLITAN AREA CLINIC 46 | Facility: CLINIC | Age: 64
End: 2024-09-20

## 2024-09-20 RX ORDER — USTEKINUMAB 90 MG/ML
MAINTENANCE: INJECT 1 SYRINGE SUBCUTANEOUSLY EVERY 6 WEEKS. REFRIGERATE. DO NOT FREEZE INJECTION, SOLUTION SUBCUTANEOUS
Qty: 1 PEN NEEDLE | Refills: 5

## 2024-09-24 ENCOUNTER — TELEPHONE ENCOUNTER (OUTPATIENT)
Dept: URBAN - METROPOLITAN AREA CLINIC 46 | Facility: CLINIC | Age: 64
End: 2024-09-24

## 2024-10-18 ENCOUNTER — TELEPHONE ENCOUNTER (OUTPATIENT)
Dept: URBAN - METROPOLITAN AREA CLINIC 46 | Facility: CLINIC | Age: 64
End: 2024-10-18

## 2024-10-21 ENCOUNTER — OFFICE VISIT (OUTPATIENT)
Dept: URBAN - METROPOLITAN AREA CLINIC 48 | Facility: CLINIC | Age: 64
End: 2024-10-21

## 2024-10-21 RX ORDER — FEXOFENADINE HYDROCHLORIDE 180 MG/1
1 TABLET SWALLOW WHOLE WITH WATER; DO NOT TAKE WITH FRUIT JUICES TABLET ORAL ONCE A DAY
Status: ACTIVE | COMMUNITY

## 2024-10-21 RX ORDER — ONDANSETRON 8 MG/1
1 TABLET TABLET, ORALLY DISINTEGRATING ORAL
Qty: 30 | Refills: 3 | Status: ACTIVE | COMMUNITY
Start: 2023-08-18

## 2024-10-21 RX ORDER — UBIDECARENONE 50 MG
AS DIRECTED CAPSULE ORAL
Status: ACTIVE | COMMUNITY

## 2024-10-21 RX ORDER — APIXABAN 5 MG/1
1 TABLET TABLET, FILM COATED ORAL TWICE A DAY
Status: ACTIVE | COMMUNITY

## 2024-10-21 RX ORDER — USTEKINUMAB 90 MG/ML
MAINTENANCE: INJECT 1 SYRINGE SUBCUTANEOUSLY EVERY 6 WEEKS. REFRIGERATE. DO NOT FREEZE INJECTION, SOLUTION SUBCUTANEOUS
Qty: 1 PEN NEEDLE | Refills: 5 | Status: ACTIVE | COMMUNITY

## 2024-10-21 RX ORDER — USTEKINUMAB 90 MG/ML
INJECT 90 MG INJECTION, SOLUTION SUBCUTANEOUS
Qty: 2 PEN NEEDLE | Refills: 5 | Status: ACTIVE | COMMUNITY
End: 2025-02-19

## 2024-10-21 RX ORDER — USTEKINUMAB 90 MG/ML
PRE-FILLED SYRINGE INJECTION, SOLUTION SUBCUTANEOUS
Qty: 2 | Refills: 5 | Status: ACTIVE | COMMUNITY
Start: 2024-06-20 | End: 2025-03-03

## 2024-10-21 RX ORDER — FLUTICASONE PROPIONATE 50 UG/1
1 SPRAY IN EACH NOSTRIL SPRAY, METERED NASAL ONCE A DAY
Status: ACTIVE | COMMUNITY

## 2024-10-21 RX ORDER — AMOXICILLIN 500 MG
AS DIRECTED CAPSULE ORAL
Status: ACTIVE | COMMUNITY

## 2024-10-21 RX ORDER — HYDROCHLOROTHIAZIDE 25 MG/1
1 TABLET IN THE MORNING TABLET ORAL ONCE A DAY
Status: ACTIVE | COMMUNITY

## 2024-10-21 RX ORDER — ACETAMINOPHEN 500 MG/1
2 TABLETS TABLET ORAL TWICE A DAY
Status: ACTIVE | COMMUNITY

## 2024-10-21 RX ORDER — ROSUVASTATIN 20 MG/1
1 TABLET TABLET, FILM COATED ORAL ONCE A DAY
Status: ACTIVE | COMMUNITY

## 2024-10-21 RX ORDER — LANSOPRAZOLE 30 MG/1
1 CAPSULE BEFORE A MEAL CAPSULE, DELAYED RELEASE ORAL ONCE A DAY
Qty: 90 CAPSULE | Refills: 3 | Status: ACTIVE | COMMUNITY

## 2024-10-21 NOTE — HPI-TODAY'S VISIT:
At last OV, recommended reducing Stelara.  Placed orders for basic labs, Barron Pro, Stelara drug levels and antibodies.  For dyspepsia, continue lansoprazole daily and Zofran as needed.  Consider EGD at follow-up if nausea not improved with Stelara.  Consider colonoscopy follow-up. Labs 10/8/2024 with hemoglobin 11.6 (appears at baseline), MCV 87.9.  Stool studies 6/27/2024 showed borderline Barron Pro 59.4.  CRP was normal.  No results for drug levels and antibodies. 64-year-old female presents for follow-up. She was dx'd with left-sided Ulcerative Colitis in 2019; still with symptoms despite oral and topical therapy. Re-staging colonsocopy 11/2019 with left-sided colitis confirmed on bx and pt started on Stelara w/ maintenence 90mg every 8 weeks. Despite therapy, she had symptoms after 6 weeks from last dose and we changed dose to every 6 weeks starting 5/2021. Seen for a follow up 11/2021 and had  a 2 week delay in getting dose in 8/2021  due to insurance recert and since then had more issues w/ symptoms at 4 weeks, bloody mucus in stools. Plan was prenisone taper.  Seen for a follow up 2/2022 and did well on predinsone but since tapering she was flaring again despite compliance with Stelara 90mg every 6 weeks: tenesmus and up to 10 bloody stools daily with mucus. Also some dyspepsia and nausea. Pt put back on prednisone and Stelara levels checked. Levels were adequate w/ no Abs. Restaging colonoscopy and random bx with no active disease-- symptoms felt to be functional. EGD also performed for dyspepsia and dysphagia witih normal esophagus s/p empiric dilation and  antral erosions (likely from prednisone).  6/2022: No flare of diarrhea or colitis. 5 BMs per day, and soft/loose but improved from earlier in year. Still on Stelara every 6 weeks and daily Apriso. Taking Pantoprazole with improvement GERD/dysphagia, but still with globus.  1/6/2023: Stools more formed. 2-3 BMs per day w/ no bleeding. Peripheral joint symptoms stable and no central joint pain. No dysphagia or GERD. Labs 9/2022 at PCP reviewed and normal CBC and CMP. On Stelara 90mg every 6 weeks and Apriso 1.5 g per day. Still at times endorsing LLQ cramping. QTG rechecked and neg; Stelara 90mg every 6 weeks continued; advised ok to wean off oral mesalamime.  8/18/2023: For a follow up. No colitis symptoms but still with alternating BMs. On Stelara 90mg every 6 weeks; did not stop oral mesalamine. recent dx of PAfib and needing high dose Flecanaide; and now on Eliquis (4/2023). Since starting, she has had GERD and dyspepsia but also ran out of PPI. Has some brain fog and fatigue; no labs since staring Eliquis  6/20/2024: Seen today for a follow-up. She was taking Stelara 90mg [id number], but had an issue with approval and did not take it for approx 12 weeks. She was then given a sample last week. She weaned off mesalamine 1 year ago. She has been experiencing loose stools approx 3-4x per day with lower abdominal cramping and endorses mucus in stool as well as tenesmus. She reports nausea, bloating/gas, and constant "gnawing, hunger-like sensation". Denies rectal bleeding, weight loss, fever. She is taking lansoprazole 30 mg daily, and zofran as needed for her nausea.  Colonoscopy 5/4/2022 showed hypertrophied anal papillae, inflammatory pseudopolyp.  Rectal biopsy consistent w/ quiescent ulcerative colitis.  Recommended repeat colon in ?5 years. EGD performed at the same time showed erosive gastropathy with stigmata of recent bleeding, otherwise normal and no specimens were collected.

## 2024-12-16 ENCOUNTER — OFFICE VISIT (OUTPATIENT)
Dept: URBAN - METROPOLITAN AREA CLINIC 48 | Facility: CLINIC | Age: 64
End: 2024-12-16
Payer: COMMERCIAL

## 2024-12-16 ENCOUNTER — LAB OUTSIDE AN ENCOUNTER (OUTPATIENT)
Dept: URBAN - METROPOLITAN AREA CLINIC 48 | Facility: CLINIC | Age: 64
End: 2024-12-16

## 2024-12-16 VITALS
HEART RATE: 76 BPM | DIASTOLIC BLOOD PRESSURE: 84 MMHG | HEIGHT: 67 IN | BODY MASS INDEX: 38.86 KG/M2 | TEMPERATURE: 97.9 F | SYSTOLIC BLOOD PRESSURE: 131 MMHG | WEIGHT: 247.6 LBS

## 2024-12-16 DIAGNOSIS — R10.13 DYSPEPSIA: ICD-10-CM

## 2024-12-16 DIAGNOSIS — K51.30 ULCERATIVE RECTOSIGMOIDITIS WITHOUT COMPLICATION: ICD-10-CM

## 2024-12-16 PROCEDURE — 99214 OFFICE O/P EST MOD 30 MIN: CPT | Performed by: INTERNAL MEDICINE

## 2024-12-16 RX ORDER — FLUTICASONE PROPIONATE 50 UG/1
1 SPRAY IN EACH NOSTRIL SPRAY, METERED NASAL ONCE A DAY
Status: ACTIVE | COMMUNITY

## 2024-12-16 RX ORDER — UBIDECARENONE 50 MG
AS DIRECTED CAPSULE ORAL
Status: ACTIVE | COMMUNITY

## 2024-12-16 RX ORDER — LANSOPRAZOLE 30 MG/1
1 CAPSULE BEFORE A MEAL CAPSULE, DELAYED RELEASE ORAL ONCE A DAY
Qty: 90 CAPSULE | Refills: 3 | Status: ACTIVE | COMMUNITY

## 2024-12-16 RX ORDER — ONDANSETRON 8 MG/1
1 TABLET TABLET, ORALLY DISINTEGRATING ORAL
Qty: 30 | Refills: 3 | Status: ACTIVE | COMMUNITY
Start: 2023-08-18

## 2024-12-16 RX ORDER — HYDROCHLOROTHIAZIDE 25 MG/1
1 TABLET IN THE MORNING TABLET ORAL ONCE A DAY
Status: ACTIVE | COMMUNITY

## 2024-12-16 RX ORDER — ACETAMINOPHEN 500 MG/1
2 TABLETS TABLET ORAL TWICE A DAY
Status: ACTIVE | COMMUNITY

## 2024-12-16 RX ORDER — USTEKINUMAB 90 MG/ML
MAINTENANCE: INJECT 1 SYRINGE SUBCUTANEOUSLY EVERY 6 WEEKS. REFRIGERATE. DO NOT FREEZE INJECTION, SOLUTION SUBCUTANEOUS
Qty: 1 PEN NEEDLE | Refills: 5 | Status: ACTIVE | COMMUNITY

## 2024-12-16 RX ORDER — AMOXICILLIN 500 MG
AS DIRECTED CAPSULE ORAL
Status: ACTIVE | COMMUNITY

## 2024-12-16 RX ORDER — ROSUVASTATIN 20 MG/1
1 TABLET TABLET, FILM COATED ORAL ONCE A DAY
Status: ACTIVE | COMMUNITY

## 2024-12-16 NOTE — HPI-TODAY'S VISIT:
64-year-old female presents for follow-up. She was dx'd with left-sided Ulcerative Colitis in 2019; still with symptoms despite oral and topical therapy. Re-staging colonsocopy 11/2019 with left-sided colitis confirmed on bx and pt started on Stelara w/ maintenence 90mg every 8 weeks. Despite therapy, she had symptoms after 6 weeks from last dose and we changed dose to every 6 weeks starting 5/2021. Seen for a follow up 11/2021 and had  a 2 week delay in getting dose in 8/2021  due to insurance recert and since then had more issues w/ symptoms at 4 weeks, bloody mucus in stools. Plan was prenisone taper.  Seen for a follow up 2/2022 and did well on predinsone but since tapering she was flaring again despite compliance with Stelara 90mg every 6 weeks: tenesmus and up to 10 bloody stools daily with mucus. Also some dyspepsia and nausea. Pt put back on prednisone and Stelara levels checked. Levels were adequate w/ no Abs. Restaging colonoscopy and random bx with no active disease-- symptoms felt to be functional. Recommended repeat colon in 5 years. EGD also performed for dyspepsia and dysphagia witih normal esophagus s/p empiric dilation and  antral erosions (likely from prednisone). No specimens collected.  6/2022: No flare of diarrhea or colitis. 5 BMs per day, and soft/loose but improved from earlier in year. Still on Stelara every 6 weeks and daily Apriso. Taking Pantoprazole with improvement GERD/dysphagia, but still with globus.  1/6/2023: Stools more formed. 2-3 BMs per day w/ no bleeding. Peripheral joint symptoms stable and no central joint pain. No dysphagia or GERD. Labs 9/2022 at PCP reviewed and normal CBC and CMP. On Stelara 90mg every 6 weeks and Apriso 1.5 g per day. Still at times endorsing LLQ cramping. QTG rechecked and neg; Stelara 90mg every 6 weeks continued; advised ok to wean off oral mesalamime.  8/18/2023: For a follow up. No colitis symptoms but still with alternating BMs. On Stelara 90mg every 6 weeks; did not stop oral mesalamine. recent dx of PAfib and needing high dose Flecanaide; and now on Eliquis (4/2023). Since starting, she has had GERD and dyspepsia but also ran out of PPI. Has some brain fog and fatigue; no labs since staring Eliquis  6/20/2024: Seen today for a follow-up. She was taking Stelara 90mg [id number], but had an issue with approval and did not take it for approx 12 weeks. She was then given a sample last week. She weaned off mesalamine 1 year ago. She has been experiencing loose stools approx 3-4x per day with lower abdominal cramping and endorses mucus in stool as well as tenesmus. Reports nausea, bloating/gas, and constant "gnawing, hunger-like sensation". Denies rectal bleeding, weight loss, fever. Taking lansoprazole 30 mg daily, and zofran as needed for her nausea.  12/16/2024: Seen for follow-up. Patient received re-induction dose of Stelara 7/30/2024, and had first maintenence dose 11/2024. Continues on Stelara 90mg q6 weeks currently, and is having 3-4 loose BMs daily. Denies rectal bleeding. She notes worsening LLQ abd pain recently, which radiates throughout lower abdomen. Pain is not relieved with BM. Denies weight loss, fever. She has had confirmed diverticulitis in the past, but has not had recent imaging. She also continues having daily nausea and indigestion, which is not relieved with lansoprazole. She is not using zofran frequently. Patient is unsure whether specific foods are triggering her symptoms. Denies vomiting, melena, dysphagia. No NSAID use. She states she had gastric ulcerations at age 20, and is unsure if she has ever been checked for H pylori. CRP was normal 6/2024, and calpro was borderline. Basic labs 10/8/2024 with Hgb 11.6 (at baseline, otherwise unremarkable). TSH was normal. Cerebrovascular accident (CVA) due to embolism of right middle cerebral artery

## 2024-12-16 NOTE — PHYSICAL EXAM EYES:
Conjuntivae and eyelids appear normal,  Sclerae : White without injection Writer conveyed results and practioner recommendations to Patient.  All questions were answered and Patient verbalized understanding.

## 2024-12-17 ENCOUNTER — LAB OUTSIDE AN ENCOUNTER (OUTPATIENT)
Dept: URBAN - METROPOLITAN AREA CLINIC 44 | Facility: CLINIC | Age: 64
End: 2024-12-17

## 2024-12-18 LAB — C-REACTIVE PROTEIN, QUANT: 15.2

## 2024-12-19 LAB
MITOGEN-NIL: >10
QUANTIFERON NIL VALUE: 0.06
QUANTIFERON TB1 AG VALUE: 0
QUANTIFERON TB2 AG VALUE: 0.03
QUANTIFERON-TB GOLD PLUS: NEGATIVE

## 2024-12-20 LAB
CALPROTECTIN, STOOL - QDX: (no result)
USTEKINUMAB AB, S: <10
USTEKINUMAB: >10

## 2025-03-21 ENCOUNTER — OFFICE VISIT (OUTPATIENT)
Dept: URBAN - METROPOLITAN AREA CLINIC 46 | Facility: CLINIC | Age: 65
End: 2025-03-21
Payer: COMMERCIAL

## 2025-03-21 DIAGNOSIS — K51.30 ULCERATIVE RECTOSIGMOIDITIS WITHOUT COMPLICATION: ICD-10-CM

## 2025-03-21 DIAGNOSIS — R10.13 DYSPEPSIA: ICD-10-CM

## 2025-03-21 PROCEDURE — 99214 OFFICE O/P EST MOD 30 MIN: CPT

## 2025-03-21 RX ORDER — MODAFINIL 100 MG/1
1 TABLET IN THE MORNING TABLET ORAL ONCE A DAY
Status: ACTIVE | COMMUNITY

## 2025-03-21 RX ORDER — UBIDECARENONE 50 MG
AS DIRECTED CAPSULE ORAL
Status: ACTIVE | COMMUNITY

## 2025-03-21 RX ORDER — ROSUVASTATIN 20 MG/1
1 TABLET TABLET, FILM COATED ORAL ONCE A DAY
Status: ACTIVE | COMMUNITY

## 2025-03-21 RX ORDER — FLUTICASONE PROPIONATE 50 UG/1
1 SPRAY IN EACH NOSTRIL SPRAY, METERED NASAL ONCE A DAY
Status: ACTIVE | COMMUNITY

## 2025-03-21 RX ORDER — AMOXICILLIN 500 MG
AS DIRECTED CAPSULE ORAL
Status: ACTIVE | COMMUNITY

## 2025-03-21 RX ORDER — ACETAMINOPHEN 500 MG/1
2 TABLETS TABLET ORAL TWICE A DAY
Status: ACTIVE | COMMUNITY

## 2025-03-21 RX ORDER — ONDANSETRON 8 MG/1
1 TABLET TABLET, ORALLY DISINTEGRATING ORAL
Qty: 30 | Refills: 3 | Status: ACTIVE | COMMUNITY
Start: 2023-08-18

## 2025-03-21 RX ORDER — USTEKINUMAB 90 MG/ML
MAINTENANCE: INJECT 1 SYRINGE SUBCUTANEOUSLY EVERY 6 WEEKS. REFRIGERATE. DO NOT FREEZE INJECTION, SOLUTION SUBCUTANEOUS
Qty: 1 PEN NEEDLE | Refills: 5 | Status: ACTIVE | COMMUNITY

## 2025-03-21 RX ORDER — HYDROCHLOROTHIAZIDE 25 MG/1
1 TABLET IN THE MORNING TABLET ORAL ONCE A DAY
Status: ACTIVE | COMMUNITY

## 2025-03-21 NOTE — HPI-TODAY'S VISIT:
64-year-old female presents for follow-up.  Last OV 12/16/2024. She was dx'd with left-sided Ulcerative Colitis in 2019; still with symptoms despite oral and topical therapy. Re-staging colonsocopy 11/2019 with left-sided colitis confirmed on bx and pt started on Stelara w/ maintenence 90mg every 8 weeks. Despite therapy, she had symptoms after 6 weeks from last dose and we changed dose to every 6 weeks starting 5/2021. Seen for a follow up 11/2021 and had a 2 week delay in getting dose in 8/2021 due to insurance recert and since then had more issues w/ symptoms at 4 weeks, bloody mucus in stools. Plan was prenisone taper.  Seen for a follow up 2/2022 and did well on predinsone but since tapering she was flaring again despite compliance with Stelara 90mg every 6 weeks: tenesmus and up to 10 bloody stools daily with mucus. Also some dyspepsia and nausea. Pt put back on prednisone and Stelara levels checked. Levels were adequate w/ no Abs. Restaging colonoscopy and random bx with no active disease-- symptoms felt to be functional. Recommended repeat colon in 5 years. EGD also performed for dyspepsia and dysphagia witih normal esophagus s/p empiric dilation and antral erosions (likely from prednisone). No specimens collected.  6/2022: No flare of diarrhea or colitis. 5 BMs per day, and soft/loose but improved from earlier in year. Still on Stelara every 6 weeks and daily Apriso. Taking Pantoprazole with improvement GERD/dysphagia, but still with globus.  1/6/2023: Stools more formed. 2-3 BMs per day w/ no bleeding. Peripheral joint symptoms stable and no central joint pain. No dysphagia or GERD. Labs 9/2022 at PCP reviewed and normal CBC and CMP. On Stelara 90mg every 6 weeks and Apriso 1.5 g per day. Still at times endorsing LLQ cramping. QTG rechecked and neg; Stelara 90mg every 6 weeks continued; advised ok to wean off oral mesalamime.  8/18/2023: For a follow up. No colitis symptoms but still with alternating BMs. On Stelara 90mg every 6 weeks; did not stop oral mesalamine. recent dx of PAfib and needing high dose Flecanaide; and now on Eliquis (4/2023). Since starting, she has had GERD and dyspepsia but also ran out of PPI. Has some brain fog and fatigue; no labs since staring Eliquis  6/20/2024: Seen today for a follow-up. She was taking Stelara 90mg [id number], but had an issue with approval and did not take it for approx 12 weeks. She was then given a sample last week. She weaned off mesalamine 1 year ago. She has been experiencing loose stools approx 3-4x per day with lower abdominal cramping and endorses mucus in stool as well as tenesmus. Reports nausea, bloating/gas, and constant "gnawing, hunger-like sensation". Denies rectal bleeding, weight loss, fever. Taking lansoprazole 30 mg daily, and zofran as needed for her nausea.  12/16/2024: Seen for follow-up. Patient received re-induction dose of Stelara 7/30/2024, and had first maintenence dose 11/2024. Continues on Stelara 90mg q6 weeks currently, and is having 3-4 loose BMs daily. Denies rectal bleeding. She notes worsening LLQ abd pain recently, which radiates throughout lower abdomen. Pain is not relieved with BM. Denies weight loss, fever. She has hx confirmed diverticulitis in the past, but no recent imaging. She also continues having daily nausea and indigestion, which is not relieved with lansoprazole. She is not using zofran frequently. Patient is unsure whether specific foods are triggering her symptoms. Denies vomiting, melena, dysphagia. No NSAID use. She states she had gastric ulcerations at age 20, and is unsure if she has ever been checked for H pylori. CRP was normal 6/2024, and calpro was borderline. Basic labs 10/8/2024 with Hgb 11.6 (at baseline, otherwise unremarkable). TSH was normal. Ordered breath test, but this was not completed. CT was denied by insurance.  3/21/2025: Patient continues on Stelara q6 weeks, and states her bowel habits are sliglhtly improved since last OV. She continues to have 3 stools daily, which are sometimes formed but may be looser. LLQ pain is improved, but she notes occasional "tugging" sensation, which has been occuring intermittently for several years with no identifiable triggers. Denies rectal bleeding, fever, weight loss. No EIMs of IBD. Nausea is persistent. She describes postprandial epigastric discomfort and early satiety. Patient states she started on modafinil which may be worsening dyspepsia symptoms. She has also not been taking lansoprazole which is causing significant indigestion and belching. She is not diabetic, and is not taking GLP-1 or narcotic pain medications. Calpro 12/17/2024 was normal. TB testing was negative, and stelara drug levels were normal, without evidence of antibody formation.

## 2025-03-25 LAB — H PYLORI BREATH TEST: NOT DETECTED

## 2025-04-08 ENCOUNTER — LAB OUTSIDE AN ENCOUNTER (OUTPATIENT)
Dept: URBAN - METROPOLITAN AREA CLINIC 44 | Facility: CLINIC | Age: 65
End: 2025-04-08

## 2025-04-08 ENCOUNTER — TELEPHONE ENCOUNTER (OUTPATIENT)
Dept: URBAN - METROPOLITAN AREA CLINIC 44 | Facility: CLINIC | Age: 65
End: 2025-04-08

## 2025-06-13 ENCOUNTER — TELEPHONE ENCOUNTER (OUTPATIENT)
Dept: URBAN - METROPOLITAN AREA CLINIC 46 | Facility: CLINIC | Age: 65
End: 2025-06-13

## 2025-06-13 RX ORDER — USTEKINUMAB 90 MG/ML
MAINTENANCE: INJECT 1 SYRINGE SUBCUTANEOUSLY EVERY 6 WEEKS. REFRIGERATE. DO NOT FREEZE INJECTION, SOLUTION SUBCUTANEOUS
Qty: 1 PEN NEEDLE | Refills: 5
End: 2026-02-20

## 2025-06-17 ENCOUNTER — TELEPHONE ENCOUNTER (OUTPATIENT)
Dept: URBAN - METROPOLITAN AREA CLINIC 46 | Facility: CLINIC | Age: 65
End: 2025-06-17

## 2025-06-20 ENCOUNTER — OFFICE VISIT (OUTPATIENT)
Dept: URBAN - METROPOLITAN AREA CLINIC 46 | Facility: CLINIC | Age: 65
End: 2025-06-20

## 2025-06-25 ENCOUNTER — TELEPHONE ENCOUNTER (OUTPATIENT)
Dept: URBAN - METROPOLITAN AREA CLINIC 6 | Facility: CLINIC | Age: 65
End: 2025-06-25

## 2025-06-27 ENCOUNTER — OFFICE VISIT (OUTPATIENT)
Dept: URBAN - METROPOLITAN AREA SURGERY CENTER 27 | Facility: SURGERY CENTER | Age: 65
End: 2025-06-27

## 2025-06-27 RX ORDER — HYDROCHLOROTHIAZIDE 25 MG/1
1 TABLET IN THE MORNING TABLET ORAL ONCE A DAY
Status: ACTIVE | COMMUNITY

## 2025-06-27 RX ORDER — ROSUVASTATIN 20 MG/1
1 TABLET TABLET, FILM COATED ORAL ONCE A DAY
Status: ACTIVE | COMMUNITY

## 2025-06-27 RX ORDER — ACETAMINOPHEN 500 MG/1
2 TABLETS TABLET ORAL TWICE A DAY
Status: ACTIVE | COMMUNITY

## 2025-06-27 RX ORDER — UBIDECARENONE 50 MG
AS DIRECTED CAPSULE ORAL
Status: ACTIVE | COMMUNITY

## 2025-06-27 RX ORDER — ONDANSETRON 8 MG/1
1 TABLET TABLET, ORALLY DISINTEGRATING ORAL
Qty: 30 | Refills: 3 | Status: ACTIVE | COMMUNITY
Start: 2023-08-18

## 2025-06-27 RX ORDER — USTEKINUMAB 90 MG/ML
MAINTENANCE: INJECT 1 SYRINGE SUBCUTANEOUSLY EVERY 6 WEEKS. REFRIGERATE. DO NOT FREEZE INJECTION, SOLUTION SUBCUTANEOUS
Qty: 1 PEN NEEDLE | Refills: 5 | Status: ACTIVE | COMMUNITY
End: 2026-02-20

## 2025-06-27 RX ORDER — MODAFINIL 100 MG/1
1 TABLET IN THE MORNING TABLET ORAL ONCE A DAY
Status: ACTIVE | COMMUNITY

## 2025-06-27 RX ORDER — AMOXICILLIN 500 MG
AS DIRECTED CAPSULE ORAL
Status: ACTIVE | COMMUNITY

## 2025-06-27 RX ORDER — FLUTICASONE PROPIONATE 50 UG/1
1 SPRAY IN EACH NOSTRIL SPRAY, METERED NASAL ONCE A DAY
Status: ACTIVE | COMMUNITY

## 2025-06-30 ENCOUNTER — TELEPHONE ENCOUNTER (OUTPATIENT)
Dept: URBAN - METROPOLITAN AREA CLINIC 46 | Facility: CLINIC | Age: 65
End: 2025-06-30

## 2025-06-30 RX ORDER — VONOPRAZAN FUMARATE 26.72 MG/1
1 TABLET TABLET ORAL ONCE A DAY
Qty: 90 TABLET | Refills: 11 | OUTPATIENT
Start: 2025-06-30 | End: 2028-06-14

## 2025-07-01 ENCOUNTER — OFFICE VISIT (OUTPATIENT)
Dept: URBAN - METROPOLITAN AREA SURGERY CENTER 28 | Facility: SURGERY CENTER | Age: 65
End: 2025-07-01